# Patient Record
Sex: FEMALE | Race: WHITE | NOT HISPANIC OR LATINO | Employment: OTHER | URBAN - METROPOLITAN AREA
[De-identification: names, ages, dates, MRNs, and addresses within clinical notes are randomized per-mention and may not be internally consistent; named-entity substitution may affect disease eponyms.]

---

## 2017-12-29 ENCOUNTER — LAB REQUISITION (OUTPATIENT)
Dept: LAB | Facility: HOSPITAL | Age: 82
End: 2017-12-29

## 2017-12-29 DIAGNOSIS — Z00.00 ROUTINE GENERAL MEDICAL EXAMINATION AT A HEALTH CARE FACILITY: ICD-10-CM

## 2017-12-29 LAB
ANION GAP SERPL CALCULATED.3IONS-SCNC: 10.3 MMOL/L
BASOPHILS # BLD AUTO: 0.01 10*3/MM3 (ref 0–0.2)
BASOPHILS NFR BLD AUTO: 0.2 % (ref 0–1.5)
BUN BLD-MCNC: 41 MG/DL (ref 8–23)
BUN/CREAT SERPL: 36.9 (ref 7–25)
CALCIUM SPEC-SCNC: 9.9 MG/DL (ref 8.6–10.5)
CHLORIDE SERPL-SCNC: 105 MMOL/L (ref 98–107)
CO2 SERPL-SCNC: 26.7 MMOL/L (ref 22–29)
CREAT BLD-MCNC: 1.11 MG/DL (ref 0.57–1)
DEPRECATED RDW RBC AUTO: 52.8 FL (ref 37–54)
EOSINOPHIL # BLD AUTO: 0.11 10*3/MM3 (ref 0–0.7)
EOSINOPHIL NFR BLD AUTO: 2.2 % (ref 0.3–6.2)
ERYTHROCYTE [DISTWIDTH] IN BLOOD BY AUTOMATED COUNT: 14.5 % (ref 11.7–13)
GFR SERPL CREATININE-BSD FRML MDRD: 46 ML/MIN/1.73
GFR SERPL CREATININE-BSD FRML MDRD: 56 ML/MIN/1.73
GLUCOSE BLD-MCNC: 103 MG/DL (ref 65–99)
HCT VFR BLD AUTO: 35.2 % (ref 35.6–45.5)
HGB BLD-MCNC: 10.7 G/DL (ref 11.9–15.5)
IMM GRANULOCYTES # BLD: 0 10*3/MM3 (ref 0–0.03)
IMM GRANULOCYTES NFR BLD: 0 % (ref 0–0.5)
LYMPHOCYTES # BLD AUTO: 1.72 10*3/MM3 (ref 0.9–4.8)
LYMPHOCYTES NFR BLD AUTO: 33.7 % (ref 19.6–45.3)
MCH RBC QN AUTO: 30.3 PG (ref 26.9–32)
MCHC RBC AUTO-ENTMCNC: 30.4 G/DL (ref 32.4–36.3)
MCV RBC AUTO: 99.7 FL (ref 80.5–98.2)
MONOCYTES # BLD AUTO: 0.52 10*3/MM3 (ref 0.2–1.2)
MONOCYTES NFR BLD AUTO: 10.2 % (ref 5–12)
NEUTROPHILS # BLD AUTO: 2.75 10*3/MM3 (ref 1.9–8.1)
NEUTROPHILS NFR BLD AUTO: 53.7 % (ref 42.7–76)
PLATELET # BLD AUTO: 211 10*3/MM3 (ref 140–500)
PMV BLD AUTO: 10.6 FL (ref 6–12)
POTASSIUM BLD-SCNC: 5 MMOL/L (ref 3.5–5.2)
RBC # BLD AUTO: 3.53 10*6/MM3 (ref 3.9–5.2)
SODIUM BLD-SCNC: 142 MMOL/L (ref 136–145)
WBC NRBC COR # BLD: 5.11 10*3/MM3 (ref 4.5–10.7)

## 2017-12-29 PROCEDURE — 80048 BASIC METABOLIC PNL TOTAL CA: CPT

## 2017-12-29 PROCEDURE — 85025 COMPLETE CBC W/AUTO DIFF WBC: CPT

## 2018-01-09 ENCOUNTER — TRANSCRIBE ORDERS (OUTPATIENT)
Dept: ADMINISTRATIVE | Facility: HOSPITAL | Age: 83
End: 2018-01-09

## 2018-01-09 DIAGNOSIS — T17.900A SILENT ASPIRATION, INITIAL ENCOUNTER: Primary | ICD-10-CM

## 2018-01-16 ENCOUNTER — APPOINTMENT (OUTPATIENT)
Dept: GENERAL RADIOLOGY | Facility: HOSPITAL | Age: 83
End: 2018-01-16
Attending: FAMILY MEDICINE

## 2018-06-10 ENCOUNTER — LAB REQUISITION (OUTPATIENT)
Dept: LAB | Facility: HOSPITAL | Age: 83
End: 2018-06-10

## 2018-06-10 ENCOUNTER — HOSPITAL ENCOUNTER (EMERGENCY)
Facility: HOSPITAL | Age: 83
Discharge: HOME | End: 2018-06-10
Attending: EMERGENCY MEDICINE | Admitting: EMERGENCY MEDICINE

## 2018-06-10 ENCOUNTER — APPOINTMENT (OUTPATIENT)
Dept: GENERAL RADIOLOGY | Facility: HOSPITAL | Age: 83
End: 2018-06-10

## 2018-06-10 VITALS
BODY MASS INDEX: 26.66 KG/M2 | DIASTOLIC BLOOD PRESSURE: 71 MMHG | SYSTOLIC BLOOD PRESSURE: 138 MMHG | HEIGHT: 65 IN | OXYGEN SATURATION: 100 % | TEMPERATURE: 97.5 F | RESPIRATION RATE: 20 BRPM | WEIGHT: 160 LBS | HEART RATE: 70 BPM

## 2018-06-10 DIAGNOSIS — R79.89 ELEVATED BRAIN NATRIURETIC PEPTIDE (BNP) LEVEL: ICD-10-CM

## 2018-06-10 DIAGNOSIS — S60.222A CONTUSION OF LEFT HAND, INITIAL ENCOUNTER: Primary | ICD-10-CM

## 2018-06-10 DIAGNOSIS — M79.642 LEFT HAND PAIN: ICD-10-CM

## 2018-06-10 DIAGNOSIS — Z00.00 ROUTINE GENERAL MEDICAL EXAMINATION AT A HEALTH CARE FACILITY: ICD-10-CM

## 2018-06-10 LAB — NT-PROBNP SERPL-MCNC: 3469 PG/ML (ref 0–1800)

## 2018-06-10 PROCEDURE — 71045 X-RAY EXAM CHEST 1 VIEW: CPT

## 2018-06-10 PROCEDURE — 73120 X-RAY EXAM OF HAND: CPT

## 2018-06-10 PROCEDURE — 83880 ASSAY OF NATRIURETIC PEPTIDE: CPT

## 2018-06-10 PROCEDURE — 99284 EMERGENCY DEPT VISIT MOD MDM: CPT

## 2018-06-10 RX ORDER — HYDROCODONE BITARTRATE AND ACETAMINOPHEN 10; 325 MG/1; MG/1
1 TABLET ORAL EVERY 6 HOURS PRN
COMMUNITY

## 2018-06-10 RX ORDER — ATENOLOL 25 MG/1
25 TABLET ORAL DAILY
COMMUNITY

## 2018-06-10 RX ORDER — DONEPEZIL HYDROCHLORIDE 10 MG/1
10 TABLET, FILM COATED ORAL NIGHTLY
COMMUNITY

## 2018-06-10 RX ORDER — ERGOCALCIFEROL 1.25 MG/1
50000 CAPSULE ORAL WEEKLY
COMMUNITY

## 2018-06-10 RX ORDER — AMLODIPINE BESYLATE 10 MG/1
10 TABLET ORAL DAILY
COMMUNITY

## 2018-06-10 RX ORDER — CALCITONIN SALMON 200 [IU]/.09ML
1 SPRAY, METERED NASAL DAILY
COMMUNITY

## 2018-06-10 RX ORDER — SENNA AND DOCUSATE SODIUM 50; 8.6 MG/1; MG/1
1 TABLET, FILM COATED ORAL DAILY
COMMUNITY

## 2018-06-10 RX ORDER — DIVALPROEX SODIUM 125 MG/1
125 TABLET, DELAYED RELEASE ORAL 3 TIMES DAILY
COMMUNITY

## 2018-06-10 RX ORDER — MIRTAZAPINE 15 MG/1
15 TABLET, FILM COATED ORAL NIGHTLY
COMMUNITY

## 2018-06-10 RX ORDER — ALBUTEROL SULFATE 1.25 MG/3ML
1 SOLUTION RESPIRATORY (INHALATION) EVERY 6 HOURS PRN
COMMUNITY

## 2018-06-10 RX ORDER — POTASSIUM CITRATE 10 MEQ/1
10 TABLET, EXTENDED RELEASE ORAL
COMMUNITY

## 2018-06-10 RX ORDER — SACCHAROMYCES BOULARDII 250 MG
250 CAPSULE ORAL 2 TIMES DAILY
COMMUNITY

## 2018-06-10 RX ORDER — BISACODYL 10 MG
10 SUPPOSITORY, RECTAL RECTAL DAILY
COMMUNITY

## 2018-06-10 RX ORDER — DOXYCYCLINE HYCLATE 100 MG/1
100 CAPSULE ORAL 2 TIMES DAILY
COMMUNITY

## 2018-06-10 NOTE — DISCHARGE INSTRUCTIONS
You are advised to follow closely with Dr Drew in 2-3 days for recheck, final results of  imaging testing, and further testing/treatment as needed.    Ice left hand as needed for discomfort    Please return to the emergency department immediately with chest pain different than usual for you, shortness of air, abdominal pain, persistent vomiting/fever, blood in emesis or stool, lightheadedness/fainting, problems with speech, one sided weakness/numbness, new incontinence, problems with vision, altered mental status or for worsening of symptoms or other concerns.

## 2018-06-10 NOTE — PROGRESS NOTES
Arrived in ER room #6. Right mid line catheter in right arm with dressing half off. Attempted to flush with no success. Attempted to flush without ultra site valve and was unsuccessful. Removed dressing and sutures and tried to pull catheter back approximately 1 cm and flush; still unable to flush. Removed mid line and held pressure with gauze for 2 minutes then dressed with vasline gauze and wrapped with kerlex.

## 2018-06-10 NOTE — ED PROVIDER NOTES
" EMERGENCY DEPARTMENT ENCOUNTER    CHIEF COMPLAINT  Chief Complaint: elevated BNP  History given by: NH, nephew  History limited by: h/o dementia  Room Number: 06/06  PMD: Mercy Drew MD      HPI:  Pt is a 87 y.o. female with a h/o dementia who presents from [facility] for elevated BNP (3469) per the facility today. Per NH notes, Patient is on doxycycline for a recent dx of pneumonia that was started 5 days ago.     Nephew states that the patient was admitted for \"fluid around the heart\", but is unsure of where she was diagnosed with this. Patient is not ambulatory at this time. Family states that she is able to feed herself, but needs prompting. Family states that they have been giving her fluids through her IV at the facility. Family notes recent cough. Denies N/V, or fevers.     Duration:  This morning  Onset: gradual  Timing: constant  Location: General  Radiation: none  Quality: elevated BNP  Intensity/Severity: moderate  Progression: unchanged  Associated Symptoms: cough  Aggravating Factors: none  Alleviating Factors: none  Previous Episodes: none  Treatment before arrival: sent from facility    PAST MEDICAL HISTORY  Active Ambulatory Problems     Diagnosis Date Noted   • No Active Ambulatory Problems     Resolved Ambulatory Problems     Diagnosis Date Noted   • No Resolved Ambulatory Problems     Past Medical History:   Diagnosis Date   • Abnormal posture    • Anxiety    • Atrial fibrillation    • Cognitive communication deficit    • Constipation    • Cough    • Dementia    • Dysphagia, oral phase    • Dysphagia, oropharyngeal phase    • Falling    • Hypertension    • Lack of coordination    • Muscle weakness    • Nondisplaced zone ii fracture of sacrum, subsequent encounter for fracture with routine healing    • Pneumonia    • Right bundle branch block    • Symbolic dysfunction    • Urinary tract infection        PAST SURGICAL HISTORY  History reviewed. No pertinent surgical " history.    FAMILY HISTORY  History reviewed. No pertinent family history.    SOCIAL HISTORY  Social History     Social History   • Marital status:      Spouse name: N/A   • Number of children: N/A   • Years of education: N/A     Occupational History   • Not on file.     Social History Main Topics   • Smoking status: Unknown If Ever Smoked   • Smokeless tobacco: Not on file   • Alcohol use Defer   • Drug use: Unknown   • Sexual activity: Defer     Other Topics Concern   • Not on file     Social History Narrative   • No narrative on file       ALLERGIES  Erythromycin    REVIEW OF SYSTEMS  Review of Systems   Unable to perform ROS: Dementia       PHYSICAL EXAM  ED Triage Vitals   Temp Heart Rate Resp BP SpO2   06/10/18 0832 06/10/18 0814 06/10/18 0832 06/10/18 0814 06/10/18 0814   97.5 °F (36.4 °C) 74 22 145/82 97 %     Physical Exam   Constitutional: She is oriented to person, place, and time.  Non-toxic appearance. She appears distressed (mild).   HENT:   Head: Normocephalic and atraumatic.   Eyes: EOM are normal.   Neck: Normal range of motion. Neck supple.   Cardiovascular: Normal rate, regular rhythm, normal heart sounds and intact distal pulses.    No murmur heard.  Pulses:       Posterior tibial pulses are 2+ on the right side, and 2+ on the left side.   Pulmonary/Chest: Effort normal and breath sounds normal. No respiratory distress.   Abdominal: Soft. Bowel sounds are normal. There is no tenderness. There is no rebound and no guarding.   Musculoskeletal: Normal range of motion. She exhibits edema (1+).   Patient has a peripheral IV in the R forearm that appears CDI.   Localized soft tissue swelling on the dorsum aspect of the L hand. Wrist appears nontender with no deformity.   Neurological: She is alert and oriented to person, place, and time.   Skin: Skin is warm and dry.   Mild skin breakdown and abrasions to bilateral anterior tibias   Psychiatric: Affect normal.   Nursing note and vitals  reviewed.      RADIOLOGY  XR Hand 2 View Left   Final Result       Soft tissue swelling. No acute fracture or erosion identified. If there   is clinical suspicion for radiographically occult osteomyelitis, MRI or   bone scan could be considered.       This report was finalized on 6/10/2018 9:41 AM by Dr. Alvaro Merritt M.D.          XR Chest 1 View   Final Result   No focal pulmonary consolidation. Borderline heart size.   Tortuous aorta. Follow-up as clinically indicated.       This report was finalized on 6/10/2018 9:36 AM by Dr. Alvaro Merritt M.D.               I ordered the above noted radiological studies. Interpreted by radiologist. Reviewed by me in PACS.       PROCEDURES  Procedures      PROGRESS AND CONSULTS      0825: I discussed plan for CHest Xray, L hand and wrist Xray. Per nephew, patient had a negative US for a DVT recently.     0838: Xrays ordered.     MEDICAL DECISION MAKING  Results were reviewed/discussed with the patient and they were also made aware of online access. Pt also made aware that some labs, such as cultures, will not be resulted during ER visit and follow up with PMD is necessary.     MDM  Number of Diagnoses or Management Options  Contusion of left hand, initial encounter:   Elevated brain natriuretic peptide (BNP) level:   Left hand pain:      Amount and/or Complexity of Data Reviewed  Tests in the radiology section of CPT®: reviewed and ordered (Hand and Wrist Xray: no fracture  Chest XRay: no consolidation or acute process)    Patient Progress  Patient progress: stable         DIAGNOSIS  Final diagnoses:   Contusion of left hand, initial encounter   Left hand pain   Elevated brain natriuretic peptide (BNP) level       DISPOSITION  DISCHARGE    Patient discharged in stable condition.    Reviewed implications of results, diagnosis, meds, responsibility to follow up, warning signs and symptoms of possible worsening, potential complications and reasons to return to  ER.    Patient/Family voiced understanding of above instructions.    Discussed plan for discharge, as there is no emergent indication for admission. Patient referred to primary care provider for BP management due to today's BP. Pt/family is agreeable and understands need for follow up and repeat testing.  Pt is aware that discharge does not mean that nothing is wrong but it indicates no emergency is present that requires admission and they must continue care with follow-up as given below or physician of their choice.     FOLLOW-UP  Mercy Drew MD  [address] [id number] [phone]    Schedule an appointment as soon as possible for a visit in 3 days      Latest Documented Vital Signs:  As of 11:31 AM  BP- 138/71 HR- 70 Temp- 97.5 °F (36.4 °C) (Tympanic) O2 sat- 100%    --  Documentation assistance provided by miles Muse for Abi Walker MD.  Information recorded by the scribe was done at my direction and has been verified and validated by me.        Viri Muse  06/10/18 [id number]       Abi Walker MD  06/12/18 [id number]

## 2018-06-10 NOTE — ED NOTES
Ice pack applied to left wrist. Pt did not tolerate left wrist being elevated.      Cammy An RN  06/10/18 0898

## 2018-09-28 ENCOUNTER — LAB REQUISITION (OUTPATIENT)
Dept: LAB | Facility: HOSPITAL | Age: 83
End: 2018-09-28

## 2018-09-28 DIAGNOSIS — Z00.00 ROUTINE GENERAL MEDICAL EXAMINATION AT A HEALTH CARE FACILITY: ICD-10-CM

## 2018-09-28 LAB
ANION GAP SERPL CALCULATED.3IONS-SCNC: 15.1 MMOL/L
BASOPHILS # BLD AUTO: 0.01 10*3/MM3 (ref 0–0.2)
BASOPHILS NFR BLD AUTO: 0.1 % (ref 0–1.5)
BUN BLD-MCNC: 44 MG/DL (ref 8–23)
BUN/CREAT SERPL: 36.7 (ref 7–25)
CALCIUM SPEC-SCNC: 10.6 MG/DL (ref 8.6–10.5)
CHLORIDE SERPL-SCNC: 108 MMOL/L (ref 98–107)
CO2 SERPL-SCNC: 23.9 MMOL/L (ref 22–29)
CREAT BLD-MCNC: 1.2 MG/DL (ref 0.57–1)
DEPRECATED RDW RBC AUTO: 58.9 FL (ref 37–54)
EOSINOPHIL # BLD AUTO: 0 10*3/MM3 (ref 0–0.7)
EOSINOPHIL NFR BLD AUTO: 0 % (ref 0.3–6.2)
ERYTHROCYTE [DISTWIDTH] IN BLOOD BY AUTOMATED COUNT: 16.1 % (ref 11.7–13)
GFR SERPL CREATININE-BSD FRML MDRD: 42 ML/MIN/1.73
GLUCOSE BLD-MCNC: 85 MG/DL (ref 65–99)
HCT VFR BLD AUTO: 36.7 % (ref 35.6–45.5)
HGB BLD-MCNC: 11.1 G/DL (ref 11.9–15.5)
IMM GRANULOCYTES # BLD: 0 10*3/MM3 (ref 0–0.03)
IMM GRANULOCYTES NFR BLD: 0 % (ref 0–0.5)
LYMPHOCYTES # BLD AUTO: 1.03 10*3/MM3 (ref 0.9–4.8)
LYMPHOCYTES NFR BLD AUTO: 13.4 % (ref 19.6–45.3)
MCH RBC QN AUTO: 30.3 PG (ref 26.9–32)
MCHC RBC AUTO-ENTMCNC: 30.2 G/DL (ref 32.4–36.3)
MCV RBC AUTO: 100.3 FL (ref 80.5–98.2)
MONOCYTES # BLD AUTO: 0.29 10*3/MM3 (ref 0.2–1.2)
MONOCYTES NFR BLD AUTO: 3.8 % (ref 5–12)
NEUTROPHILS # BLD AUTO: 6.34 10*3/MM3 (ref 1.9–8.1)
NEUTROPHILS NFR BLD AUTO: 82.7 % (ref 42.7–76)
PLATELET # BLD AUTO: 217 10*3/MM3 (ref 140–500)
PMV BLD AUTO: 11.6 FL (ref 6–12)
POTASSIUM BLD-SCNC: 4.8 MMOL/L (ref 3.5–5.2)
RBC # BLD AUTO: 3.66 10*6/MM3 (ref 3.9–5.2)
SODIUM BLD-SCNC: 147 MMOL/L (ref 136–145)
VALPROATE SERPL-MCNC: 11 MCG/ML (ref 50–125)
WBC NRBC COR # BLD: 7.67 10*3/MM3 (ref 4.5–10.7)

## 2018-09-28 PROCEDURE — 80164 ASSAY DIPROPYLACETIC ACD TOT: CPT

## 2018-09-28 PROCEDURE — 80048 BASIC METABOLIC PNL TOTAL CA: CPT

## 2018-09-28 PROCEDURE — 85025 COMPLETE CBC W/AUTO DIFF WBC: CPT

## 2018-10-05 ENCOUNTER — LAB REQUISITION (OUTPATIENT)
Dept: LAB | Facility: HOSPITAL | Age: 83
End: 2018-10-05

## 2018-10-05 DIAGNOSIS — Z00.00 ROUTINE GENERAL MEDICAL EXAMINATION AT A HEALTH CARE FACILITY: ICD-10-CM

## 2018-10-05 LAB — NT-PROBNP SERPL-MCNC: 2401 PG/ML (ref 0–1800)

## 2018-10-05 PROCEDURE — 83880 ASSAY OF NATRIURETIC PEPTIDE: CPT
